# Patient Record
(demographics unavailable — no encounter records)

---

## 2024-10-24 NOTE — CONSULT LETTER
[Courtesy Letter:] : I had the pleasure of seeing your patient, [unfilled], in my office today. [Sincerely,] : Sincerely, [FreeTextEntry2] : Steven Brunner [address] [place] [address] [FreeTextEntry3] : Lamine Lazo MD Chief, Pediatric Otolaryngology [facility] and Jayshree Rose [facility] Professor of Otolaryngology [facility] [employer] at [facility]

## 2024-10-24 NOTE — HISTORY OF PRESENT ILLNESS
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 No ear infections No otorrhea

## 2025-05-08 NOTE — HISTORY OF PRESENT ILLNESS
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

## 2025-05-08 NOTE — PHYSICAL EXAM
[Complete] : complete cerumen impaction [Placement/Patency] : tympanostomy tube in place and patent [Clear/Ventilated] : middle ear clear and well ventilated [Normal muscle strength, symmetry and tone of facial, head and neck musculature] : normal muscle strength, symmetry and tone of facial, head and neck musculature [Normal] : no cervical lymphadenopathy [Increased Work of Breathing] : no increased work of breathing with use of accessory muscles and retractions

## 2025-05-08 NOTE — CONSULT LETTER
[Courtesy Letter:] : I had the pleasure of seeing your patient, [unfilled], in my office today. [Sincerely,] : Sincerely, [FreeTextEntry2] : Steven Brunner [address] [place] [address] [FreeTextEntry3] : Lamine Lazo MD Chief, Pediatric Otolaryngology [facility] and Jayshree Rose [facility] Professor of Otolaryngology [employer] at [facility]